# Patient Record
Sex: MALE | Employment: STUDENT | URBAN - METROPOLITAN AREA
[De-identification: names, ages, dates, MRNs, and addresses within clinical notes are randomized per-mention and may not be internally consistent; named-entity substitution may affect disease eponyms.]

---

## 2021-07-13 ENCOUNTER — OFFICE VISIT (OUTPATIENT)
Dept: SURGERY | Facility: CLINIC | Age: 21
End: 2021-07-13
Payer: COMMERCIAL

## 2021-07-13 VITALS
HEIGHT: 73 IN | HEART RATE: 71 BPM | BODY MASS INDEX: 22.53 KG/M2 | DIASTOLIC BLOOD PRESSURE: 64 MMHG | WEIGHT: 170 LBS | SYSTOLIC BLOOD PRESSURE: 98 MMHG | RESPIRATION RATE: 16 BRPM | OXYGEN SATURATION: 97 %

## 2021-07-13 DIAGNOSIS — L98.8 PILONIDAL DISEASE: Primary | ICD-10-CM

## 2021-07-13 PROCEDURE — 99203 OFFICE O/P NEW LOW 30 MIN: CPT | Performed by: SURGERY

## 2021-07-13 RX ORDER — CEPHALEXIN 500 MG/1
500 CAPSULE ORAL 2 TIMES DAILY
COMMUNITY
Start: 2021-07-12 | End: 2021-07-21

## 2021-07-13 ASSESSMENT — MIFFLIN-ST. JEOR: SCORE: 1834.99

## 2021-07-13 NOTE — LETTER
2021    RE: Amos Collier, : 2000      Assessment:    Amos Collier is seen in consultation for a pilonidal cyst, at the request of Israel Leigh MD.     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     Plan:  We have had a detailed discussion regarding the nature of sacrococcygeal pilonidal disease and treatment options.  I have explained the need for meticulous wound care if surgical excision is elected.  Surgical excision would involve excision of all the pits and sinus tracts and likely leaving it open to heal by secondary intent. Even with such wound care prolonged wound healing and recurrent infections often occur. I have offered him continued observation versus excision.  I do not recommend antibiotics.    With observation I recommended hair removal around the gluteal cleft area and good hygiene, cleaning, to prevent hair and skin from becoming trapped which can be a setup for recurrent infections.  He chooses continued observation and to think it over.  He is starting school in 6 weeks including marching band which is quite intense. I think it would be difficult to get the surgery scheduled, done and have him healed by this time so he can start marching band, so he may opt to wait for surgery until January       HPI:  Amos presents today in consultation for a painful lump on his tailbone. Noticed it after a 3 hour car drive. It burst opened and drained spontaneously. His mother pulled out a reddish, soft piece of material from the opening, not sure what it was. He noted other small openings below that which have drained as well. He saw his doctor and got a prescription for keflex that he started 2 days ago due to ongoing drainage. He has not had a previous incision and drainage procedure. Never had issues with this before.     Past Medical History:  Has no past medical history on file.             Social History:  No smoking or  "etoh  In college at [employer]              Family History:  Mom reports herself, father and siblings are all healthy     ROS:  The 10 point review of systems is negative other than noted in the HPI and above.     PE:  BP 98/64   Pulse 71   Resp 16   Ht 1.854 m (6' 1\")   Wt 77.1 kg (170 lb)   SpO2 97%   BMI 22.43 kg/m    General appearance: well-nourished, no apparent distress  Skin: there is a 0.5cm raised lesion on the left upper gluteal cleft without surrounding erythema. There is a small amount of yellow drainage on a gauze pad over the area. There are multiple punctums below this located in the carla cleft, without induration, drainage or erythema.        Maritza Cortez MD       "

## 2021-07-13 NOTE — PROGRESS NOTES
Assessment:    Amos Collier is seen in consultation for a pilonidal cyst, at the request of Israel Leigh MD.    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    Plan:  We have had a detailed discussion regarding the nature of sacrococcygeal pilonidal disease and treatment options.  I have explained the need for meticulous wound care if surgical excision is elected.  Surgical excision would involve excision of all the pits and sinus tracts and likely leaving it open to heal by secondary intent. Even with such wound care prolonged wound healing and recurrent infections often occur. I have offered him continued observation versus excision.  I do not recommend antibiotics.    With observation I recommended hair removal around the gluteal cleft area and good hygiene, cleaning, to prevent hair and skin from becoming trapped which can be a setup for recurrent infections.  He chooses continued observation and to think it over.  He is starting school in 6 weeks including marching band which is quite intense. I think it would be difficult to get the surgery scheduled, done and have him healed by this time so he can start marching band, so he may opt to wait for surgery until January       HPI:  Amos presents today in consultation for a painful lump on his tailbone. Noticed it after a 3 hour car drive. It burst opened and drained spontaneously. His mother pulled out a reddish, soft piece of material from the opening, not sure what it was. He noted other small openings below that which have drained as well. He saw his doctor and got a prescription for keflex that he started 2 days ago due to ongoing drainage. He has not had a previous incision and drainage procedure. Never had issues with this before.    Past Medical History:   has no past medical history on file.    Past Surgical History:  No past surgical history on file.     Social History:  No smoking or  "etoh  In college at [employer]     Family History:  Mom reports herself, father and siblings are all healthy    ROS:  The 10 point review of systems is negative other than noted in the HPI and above.    PE:  BP 98/64   Pulse 71   Resp 16   Ht 1.854 m (6' 1\")   Wt 77.1 kg (170 lb)   SpO2 97%   BMI 22.43 kg/m    General appearance: well-nourished, no apparent distress  Skin: there is a 0.5cm raised lesion on the left upper gluteal cleft without surrounding erythema. There is a small amount of yellow drainage on a gauze pad over the area. There are multiple punctums below this located in the carla cleft, without induration, drainage or erythema.       This note was created using voice recognition software. Undetected word substitutions or other errors may have occurred.     30 minutes spent on the date of the encounter doing chart review, history and exam, documentation and further activities as noted above      Maritza Cortez MD      Please route or send letter to:  Referring Provider        "